# Patient Record
Sex: MALE | Race: WHITE | Employment: FULL TIME | URBAN - METROPOLITAN AREA
[De-identification: names, ages, dates, MRNs, and addresses within clinical notes are randomized per-mention and may not be internally consistent; named-entity substitution may affect disease eponyms.]

---

## 2020-05-08 ENCOUNTER — TELEMEDICINE (OUTPATIENT)
Dept: FAMILY MEDICINE CLINIC | Age: 30
End: 2020-05-08
Payer: COMMERCIAL

## 2020-05-08 PROCEDURE — 99203 OFFICE O/P NEW LOW 30 MIN: CPT | Performed by: FAMILY MEDICINE

## 2020-05-08 RX ORDER — NAPROXEN 500 MG/1
500 TABLET ORAL 2 TIMES DAILY PRN
Qty: 60 TABLET | Refills: 0 | Status: SHIPPED | OUTPATIENT
Start: 2020-05-08

## 2020-05-08 ASSESSMENT — ENCOUNTER SYMPTOMS: SHORTNESS OF BREATH: 0

## 2020-05-19 ENCOUNTER — OFFICE VISIT (OUTPATIENT)
Dept: ORTHOPEDIC SURGERY | Age: 30
End: 2020-05-19
Payer: COMMERCIAL

## 2020-05-19 VITALS — BODY MASS INDEX: 20.95 KG/M2 | HEIGHT: 73 IN | WEIGHT: 158.07 LBS

## 2020-05-19 PROCEDURE — 99243 OFF/OP CNSLTJ NEW/EST LOW 30: CPT | Performed by: ORTHOPAEDIC SURGERY

## 2020-05-19 NOTE — PROGRESS NOTES
Types: Marijuana    Sexual activity: Yes     Partners: Female   Lifestyle    Physical activity     Days per week: Not on file     Minutes per session: Not on file    Stress: Not on file   Relationships    Social connections     Talks on phone: Not on file     Gets together: Not on file     Attends Gnosticism service: Not on file     Active member of club or organization: Not on file     Attends meetings of clubs or organizations: Not on file     Relationship status: Not on file    Intimate partner violence     Fear of current or ex partner: Not on file     Emotionally abused: Not on file     Physically abused: Not on file     Forced sexual activity: Not on file   Other Topics Concern    Not on file   Social History Narrative    Not on file       Family History   Problem Relation Age of Onset    Diabetes Maternal Grandmother     Arthritis Maternal Grandmother     High Blood Pressure Maternal Grandfather     Arthritis Maternal Grandfather     Diabetes Paternal Grandmother     High Blood Pressure Paternal Grandfather           REVIEW OF SYSTEMS  Pertinent items are noted in HPI  Review of systems reviewed from Patient History Form dated on 5/19/2020 and available in the patient's chart under the Media tab. PHYSICAL EXAM    Vitals:    05/19/20 1451   Weight: 158 lb 1.1 oz (71.7 kg)   Height: 6' 0.99\" (1.854 m)       General Exam:   Constitutional: Patient is adequately groomed with no evidence of malnutrition  Mental Status: The patient is oriented to time, place and person. The patient's mood and affect are appropriate. Lymphatic: The lymphatic examination bilaterally reveals all areas to be without enlargement or induration. Neurological: The patient has good coordination. There is no weakness or sensory deficit. Antalgic gait:  Yes    Bilateral lower extremities are neurovascularly intact with symmetric light touch sensation and distal pulses.     Left Knee Exam:  No skin lesions, erythema, or

## 2020-11-02 RX ORDER — MINOCYCLINE HYDROCHLORIDE 50 MG/1
50 CAPSULE ORAL DAILY
Qty: 30 CAPSULE | Refills: 1 | Status: SHIPPED | OUTPATIENT
Start: 2020-11-02

## 2025-01-22 ENCOUNTER — HOSPITAL ENCOUNTER (EMERGENCY)
Age: 35
Discharge: HOME | End: 2025-01-22
Attending: EMERGENCY MEDICINE
Payer: COMMERCIAL

## 2025-01-22 VITALS
OXYGEN SATURATION: 99 % | HEART RATE: 71 BPM | RESPIRATION RATE: 18 BRPM | HEIGHT: 73 IN | SYSTOLIC BLOOD PRESSURE: 150 MMHG | TEMPERATURE: 98.1 F | WEIGHT: 164.8 LBS | DIASTOLIC BLOOD PRESSURE: 90 MMHG | BODY MASS INDEX: 21.84 KG/M2

## 2025-01-22 DIAGNOSIS — L02.419 THIGH ABSCESS: Primary | ICD-10-CM

## 2025-01-22 PROCEDURE — 10060 I&D ABSCESS SIMPLE/SINGLE: CPT

## 2025-01-22 PROCEDURE — 99283 EMERGENCY DEPT VISIT LOW MDM: CPT

## 2025-01-22 PROCEDURE — 6370000000 HC RX 637 (ALT 250 FOR IP): Performed by: EMERGENCY MEDICINE

## 2025-01-22 RX ORDER — HYDROCODONE BITARTRATE AND ACETAMINOPHEN 5; 325 MG/1; MG/1
1 TABLET ORAL ONCE
Status: COMPLETED | OUTPATIENT
Start: 2025-01-22 | End: 2025-01-22

## 2025-01-22 RX ORDER — IBUPROFEN 800 MG/1
800 TABLET, FILM COATED ORAL EVERY 8 HOURS PRN
Qty: 30 TABLET | Refills: 0 | Status: SHIPPED | OUTPATIENT
Start: 2025-01-22

## 2025-01-22 RX ORDER — IBUPROFEN 800 MG/1
800 TABLET, FILM COATED ORAL ONCE
Status: COMPLETED | OUTPATIENT
Start: 2025-01-22 | End: 2025-01-22

## 2025-01-22 RX ADMIN — HYDROCODONE BITARTRATE AND ACETAMINOPHEN 1 TABLET: 5; 325 TABLET ORAL at 09:40

## 2025-01-22 RX ADMIN — IBUPROFEN 800 MG: 800 TABLET, FILM COATED ORAL at 09:40

## 2025-01-22 NOTE — ED PROVIDER NOTES
[facility] EMERGENCY DEPARTMENT     EMERGENCY DEPARTMENT ENCOUNTER     Location: [facility] EMERGENCY DEPARTMENT  1/22/2025  Note Started: 9:47 AM EST 1/22/25      Patient Identification  Gigi Preciado is a 34 y.o. male  Chief Complaint   Patient presents with    Cyst     Back of right leg, started saturday       HPI:Gigi Preciado was evaluated in the Emergency Department for abscess.  Patient reports he had an abscess on the right thigh before and this 1 has been there 2 days and is quite painful.  He denies any fevers or vomiting.. Although initial history and physical exam information was obtained by EMILY/NPP/MD/ (who also dictated a record of this visit), I personally saw the patient and performed and made/approved the management plan and take responsibility for the patient management.      PHYSICAL EXAM:  There is a fluctuant area on the posterior right thigh that is erythematous and tender and warm with about 2 cm of surrounding erythema.    PROCEDURE:  INCISION & DRAINAGE  Gigi Preciado or their surrogate had an opportunity to ask questions, and the risks, benefits, and alternatives were discussed. The abscess was prepped and draped to maintain a sterile field. A local anesthetic was used to completely anesthetize the abscess. A cruciate incision was made to keep the abscess open so it will continue to drain.  There were no complications during the procedure.    Patient seen and evaluated.  Relevant records reviewed.  MDM     There is no significant cellulitis so I do not believe the patient requires any antibiotics.  I advised him to squeeze the abscess a couple times a day to keep it draining and to return for any new or worsening symptoms such as increasing pain or spreading rash.        CLINICAL IMPRESSION  1. Thigh abscess          I, Holger Peacock MD, am the primary clinician of record.       This chart was generated in part by using Dragon Dictation system and may contain errors

## 2025-01-22 NOTE — ED PROVIDER NOTES
[facility] EMERGENCY DEPARTMENT  EMERGENCY DEPARTMENT ENCOUNTER        Pt Name: Gigi Preciado  MRN: [id number]  Birthdate 1990  Date of evaluation: 1/22/2025  Provider: KELLY Lilly  PCP: No primary care provider on file.  Note Started: 9:23 AM EST 1/22/25       I have seen and evaluated this patient with my supervising physician Holger Peacock MD.      CHIEF COMPLAINT       Chief Complaint   Patient presents with    Cyst     Back of right leg, started saturday       HISTORY OF PRESENT ILLNESS: 1 or more Elements     History from : Patient    Limitations to history : None    Gigi Preciado is a 34 y.o. male who presents to the emergency department complaining of a cyst on the back of his right leg.  He states he has a history of a cyst on his leg which she was able to previously get to go away by using a warm compress and it spontaneously drained on its own.  His symptoms started again on Saturday.  He complains of significant pain and swelling.  He states it is very uncomfortable and difficult to sit so he decided to come in the emergency department.  He has not been on any recent antibiotics.  The cyst is located on the back of his right leg just below his buttock.    Nursing Notes were all reviewed and agreed with or any disagreements were addressed in the HPI.    REVIEW OF SYSTEMS :      Review of Systems    Positives and Pertinent negatives as per HPI.     SURGICAL HISTORY     Past Surgical History:   Procedure Laterality Date    OTHER SURGICAL HISTORY  12/07/2016    EXCISION MASS FOREHEAD                    WISDOM TOOTH EXTRACTION         CURRENTMEDICATIONS       Previous Medications    DICLOFENAC (VOLTAREN) 50 MG EC TABLET    Take 1 tablet by mouth 2 times daily (with meals)    MINOCYCLINE (MINOCIN;DYNACIN) 50 MG CAPSULE    Take 1 capsule by mouth daily       ALLERGIES     Pcn [penicillins]    FAMILYHISTORY       Family History   Problem Relation Age of Onset    Diabetes Maternal